# Patient Record
(demographics unavailable — no encounter records)

---

## 2025-02-13 NOTE — PHYSICAL EXAM
[General Appearance - Alert] : alert [] : normal voice and communication [Oriented To Time, Place, And Person] : oriented to person, place, and time [Impaired Insight] : insight and judgment were intact [Affect] : the affect was normal
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
## 2025-02-13 NOTE — HISTORY OF PRESENT ILLNESS
[FreeTextEntry1] : Patient presents for annual visit and follow up on [facility]-certified condition: prostate cancer  Prostate Cancer: - Diagnosed in 6/2023: [place] 4+3 involving left mid and 3+4 involving lesion 2 - s/p Lupron injections (July and November 2023 per patient), Cyberknife radiation therapy - Currently receiving hyperbaric therapy, under urologist Dr. Cachorro Reynaga - Follows with urologist Dr. Bernard and rad/onc Dr. Schafer every 6 months

## 2025-02-13 NOTE — REASON FOR VISIT
[Other Location: e.g. School (Enter Location, City,State)___] : at [unfilled], at the time of the visit. [Other Location: e.g. Home (Enter Location, City,State)___] : at [unfilled] [Telephone (audio)] : This telephonic visit was provided via audio only technology. [Patient preference] : patient preference. [Verbal consent obtained from patient] : the patient, [unfilled] [Follow-Up] : a follow-up visit

## 2025-02-13 NOTE — HISTORY OF PRESENT ILLNESS
[FreeTextEntry1] : Patient presents for annual visit and follow up on [facility]-certified condition: prostate cancer  Prostate Cancer: - Diagnosed in 6/2023: [place] 4+3 involving left mid and 3+4 involving lesion 2 - s/p Lupron injections (July and November 2023 per patient), Cyberknife radiation therapy - Currently receiving hyperbaric therapy, under urologist Dr. Cachorro Reynaga - Follows with urologist Dr. Bernard and rad/onc Dr. Schafer every 6 months

## 2025-02-13 NOTE — HEALTH RISK ASSESSMENT
[Patient reported colonoscopy was normal] : Patient reported colonoscopy was normal [ColonoscopyDate] : 8/2024

## 2025-02-13 NOTE — PAST MEDICAL HISTORY
[FreeTextEntry1] : See [facility] monitoring note for exposure history.
[FreeTextEntry1] : See [facility] monitoring note for exposure history.
no